# Patient Record
Sex: FEMALE | Race: BLACK OR AFRICAN AMERICAN
[De-identification: names, ages, dates, MRNs, and addresses within clinical notes are randomized per-mention and may not be internally consistent; named-entity substitution may affect disease eponyms.]

---

## 2017-07-31 ENCOUNTER — HOSPITAL ENCOUNTER (EMERGENCY)
Dept: HOSPITAL 74 - JERFT | Age: 57
Discharge: HOME | End: 2017-07-31
Payer: COMMERCIAL

## 2017-07-31 VITALS — BODY MASS INDEX: 26.5 KG/M2

## 2017-07-31 VITALS — TEMPERATURE: 97.9 F | DIASTOLIC BLOOD PRESSURE: 79 MMHG | SYSTOLIC BLOOD PRESSURE: 174 MMHG | HEART RATE: 53 BPM
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
## 2017-07-31 DIAGNOSIS — S82.832A: Primary | ICD-10-CM

## 2017-07-31 PROCEDURE — 2W3MX1Z IMMOBILIZATION OF LEFT LOWER EXTREMITY USING SPLINT: ICD-10-PCS

## 2017-07-31 NOTE — PDOC
History of Present Illness





- General


Chief Complaint: Injury


Stated Complaint: ANKLE PAIN


Time Seen by Provider: 07/31/17 11:45


History Source: Patient


Exam Limitations: No Limitations





- History of Present Illness


Initial Comments: 





07/31/17 12:33


Was walking this morning, stumbled and fell, uncertain as to mechanism 

sustained a left ankle injury. States is painful to walk unable to bear weight. 

No previous injury to same. No other injury


Occurred: reports: just prior to arrival


Severity: reports: mild


Modifying Factors: improves with: None


Associated Symptoms (Fall): denies symptoms





Past History





- Travel


Traveled outside of the country in the last 30 days: No


Close contact w/someone who was outside of country & ill: No





- Past Medical History


Allergies/Adverse Reactions: 


 Allergies











Allergy/AdvReac Type Severity Reaction Status Date / Time


 


prednisone Allergy   Verified 07/31/17 11:07











Home Medications: 


Ambulatory Orders





Alendronate Na [Fosamax (Weekly)] 70 mg PO WEEKLY 02/23/15 


Famotidine [Pepcid -] 20 mg PO DAILY 02/23/15 


Hydrochlorothiazide [Hctz -] 12.5 mg PO DAILY 02/23/15 


Levothyroxine [Synthroid -] 50 mcg PO DAILY 02/23/15 


Simvastatin [Zocor -] 20 mg PO HS 02/23/15 


Metoprolol Tartrate [Lopressor -] 25 mg PO BID 02/24/15 


Acetaminophen/Caffeine/Butalb [Fioricet -] 1 tab PO Q6H PRN #20 tablet 08/31/16 


Acetaminophen/Caffeine/Butalb [Fioricet -] 1 tab PO Q6H PRN #20 tablet 08/31/16 


Metoclopramide HCl [Reglan] 10 mg PO Q6H PRN #15 tablet 08/31/16 


Naproxen [Naprosyn -] 500 mg PO BID PRN #20 tablet 08/31/16 








Anemia: No


Asthma: No


Cancer: No


COPD: No


Diabetes: No


HTN: Yes


Hypercholesterolemia: Yes


Liver Disease: No


Seizures: No


Thyroid Disease: Yes (Hypo)





- Surgical History


Abdominal Surgery: No


Lung Surgery: No


Neurologic Surgery: No





- Immunization History


Immunization Up to Date: Yes





- Psycho/Social/Smoking Cessation Hx


Suicidal Ideation: No


Smoking History: Never smoked


Have you smoked in the past 12 months: No


If you are a former smoker, when did you quit?: 20yrs


Information on smoking cessation initiated: No


Hx Alcohol Use: No


Drug/Substance Use Hx: No


Substance Use Type: None





Trauma Specific PMHX





- Complaint Specific PMHX


Back Injury: No


Neck Injury: No





**Review of Systems





- Review of Systems


Able to Perform ROS?: Yes


Is the patient limited English proficient: Yes


Constitutional: Yes: Symptoms Reported


Respiratory: No: Symptoms reported


Musculoskeletal: Yes: Symptoms Reported, See HPI, Joint Pain, Joint Swelling (

swelling and pain to )


Integumentary: Yes: Symptoms Reported, See HPI, Bruising


Neurological: Yes: See HPI.  No: Symptoms reported


All Other Systems: Reviewed and Negative





*Physical Exam





- Vital Signs


 Last Vital Signs











Temp Pulse Resp BP Pulse Ox


 


 97.9 F   53 L  17   174/79   100 


 


 07/31/17 11:07  07/31/17 11:07  07/31/17 11:07  07/31/17 11:07  07/31/17 11:07














- Physical Exam


General Appearance: Yes: Nourished, Appropriately Dressed, Apparent Distress, 

Mild Distress


HEENT: positive: [place], Normal ENT Inspection, TMs Normal, Pharynx Normal


Extremity: positive: Normal Capillary Refill, Tender (point tenderness to the 

lateral malleolus, with swelling, negative squeeze test, negative navicular or 

fifth metatarsal pain. Range of motion is limited secondary to this tenderness. 

Neurovascular intact to foot).  negative: Normal Inspection, Normal Range of 

Motion


Integumentary: positive: Dry, Warm, Ecchymosis


Neurologic: positive: CNs II-XII NML intact, Fully Oriented, Alert, Normal Mood/

Affect, Normal Response, Motor Strength 5/5





ED Treatment Course





- RADIOLOGY


Radiology Studies Ordered: 














 Category Date Time Status


 


 ANKLE-LEFT [RAD] Stat Radiology  07/31/17 11:46 Taken














Progress Note





- Progress Note


Progress Note: 


Distal fibula fracture/avulsion fracture. Ace wrap Aircast and crutches 

provided. Will follow-up with Dr. Rodriguez orthopedist this week





*DC/Admit/Observation/Transfer


Diagnosis at time of Disposition: 


Fracture of distal end of left fibula


Qualifiers:


 Encounter type: initial encounter Fracture type: closed Fracture morphology: 

other fracture Qualified Code(s): S82.832A - Other fracture of upper and lower 

end of left fibula, initial encounter for closed fracture





- Discharge Dispostion


Disposition: HOME


Condition at time of disposition: Stable


Admit: No





- Referrals


Referrals: 


Peter Deleon MD [Primary Care Provider] - 


Ed Rodriguez MD [Staff Physician] - 





- Patient Instructions


Printed Discharge Instructions:  DI for Ankle Fracture


Additional Instructions: 


Rest, ice to area on and off for 15 minutes 4-6 times a day


Avoid heavy lifting or exercise until pain and swelling is resolved or until 

further directed


Keep area highly elevated to reduce swelling


Use splints/Ace wrap as directed


Followup with orthopedist in one to 2 days if not improving, 


    if significantly improved may wait one week for followup with orthopedist





May use ibuprofen 2-200 mg tablets every 6 hours as needed for pain


May take one half to one tablet of the Percocet tablets every 8 hours, 

remembering will make very dizzy and sleepy





Dr. Rodriguez sees patients on Tuesday mornings 9 AM to 12 PM and Thursday 

afternoon 12 PM to 4 PM on [address] at [facility]





- Post Discharge Activity


Work/School Note:  Back to Work

## 2018-01-31 ENCOUNTER — HOSPITAL ENCOUNTER (OUTPATIENT)
Dept: HOSPITAL 74 - FASU-ENDO | Age: 58
Discharge: HOME | End: 2018-01-31
Attending: INTERNAL MEDICINE
Payer: COMMERCIAL

## 2018-01-31 VITALS — SYSTOLIC BLOOD PRESSURE: 106 MMHG | DIASTOLIC BLOOD PRESSURE: 74 MMHG | TEMPERATURE: 98 F | HEART RATE: 68 BPM

## 2018-01-31 VITALS — BODY MASS INDEX: 30.9 KG/M2

## 2018-01-31 DIAGNOSIS — K25.9: ICD-10-CM

## 2018-01-31 DIAGNOSIS — D13.1: ICD-10-CM

## 2018-01-31 DIAGNOSIS — D64.9: Primary | ICD-10-CM

## 2018-01-31 DIAGNOSIS — K29.50: ICD-10-CM

## 2018-01-31 PROCEDURE — 0DB68ZX EXCISION OF STOMACH, VIA NATURAL OR ARTIFICIAL OPENING ENDOSCOPIC, DIAGNOSTIC: ICD-10-PCS | Performed by: INTERNAL MEDICINE

## 2018-01-31 PROCEDURE — 0DB98ZX EXCISION OF DUODENUM, VIA NATURAL OR ARTIFICIAL OPENING ENDOSCOPIC, DIAGNOSTIC: ICD-10-PCS | Performed by: INTERNAL MEDICINE

## 2018-01-31 PROCEDURE — 0DB48ZX EXCISION OF ESOPHAGOGASTRIC JUNCTION, VIA NATURAL OR ARTIFICIAL OPENING ENDOSCOPIC, DIAGNOSTIC: ICD-10-PCS | Performed by: INTERNAL MEDICINE

## 2018-02-02 NOTE — PATH
Surgical Pathology Report



Patient Name:  ALLYSSA HENRIQUEZ

Accession #:  

[facility]. Rec. #:  [id number]                                                        

   /Age/Gender:  1960 (Age: 57) / F

Account:  [id number]                                                          

             Location: [facility]-ENDOSCOPY

Taken:  2018

Received:  2018

Reported:  2018

Physicians:  Eileen Mckeon M.D.

  



Specimen(s) Received

A: BX DUODENUM 

B: BX ANTRUM 

C: BX GE JUNCTION 





Clinical History

Preoperative diagnosis: Anemia

Postoperative diagnosis: Rule out celiac disease, mild gastritis, antral ulcer

and erosions







Final Diagnosis

A. DUODENUM, BIOPSY:

DUODENAL MUCOSA WITH NO PATHOLOGIC CHANGES.

NO HISTOLOGIC EVIDENCE OF GLUTEN SENSITIVE ENTEROPATHY (CELIAC SPRUE)

IDENTIFIED. 



B. STOMACH, ANTRUM, BIOPSY:

FOCAL MILD CHRONIC GASTRITIS.

IMMUNOSTAIN FOR H. PYLORI IS NEGATIVE.  



C. GE JUNCTION, BIOPSY:

SQUAMOUS AND GASTRIC MUCOSA WITH CHRONIC INFLAMMATION, WITH PAPILLOMATOSIS

SUGGESTIVE OF REFLUX ESOPHAGITIS, AND FOCAL INTESTINAL METAPLASIA CONSISTENT

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

NO DYSPLASIA IDENTIFIED.







***Electronically Signed***

Steve Sanford M.D.





Gross Description

A.  Received in formalin, labeled "duodenum" is a tan, irregular portion of soft

tissue measuring 0.4 cm. in greatest dimension. The specimen is submitted in

toto in one cassette.



B.  Received in formalin, labeled "antrum" are 2 tan, irregular portions of soft

tissue averaging 0.4 cm. in greatest dimension. The specimens are submitted in

toto in one cassette.



C.  Received in formalin, labeled "GE junction" are 2 tan, irregular portions of

soft tissue averaging 0.2 cm. in greatest dimension. The specimens are submitted

in toto in one cassette.





[facility]

## 2018-10-10 ENCOUNTER — HOSPITAL ENCOUNTER (OUTPATIENT)
Dept: HOSPITAL 74 - FASU-ENDO | Age: 58
Discharge: HOME | End: 2018-10-10
Attending: INTERNAL MEDICINE
Payer: COMMERCIAL

## 2018-10-10 VITALS — BODY MASS INDEX: 32 KG/M2

## 2018-10-10 VITALS — DIASTOLIC BLOOD PRESSURE: 65 MMHG | SYSTOLIC BLOOD PRESSURE: 112 MMHG | HEART RATE: 72 BPM

## 2018-10-10 VITALS — TEMPERATURE: 98.3 F

## 2018-10-10 DIAGNOSIS — D64.9: Primary | ICD-10-CM

## 2018-10-10 DIAGNOSIS — K64.2: ICD-10-CM

## 2018-10-10 PROCEDURE — 0DJD8ZZ INSPECTION OF LOWER INTESTINAL TRACT, VIA NATURAL OR ARTIFICIAL OPENING ENDOSCOPIC: ICD-10-PCS | Performed by: INTERNAL MEDICINE

## 2019-12-28 ENCOUNTER — HOSPITAL ENCOUNTER (EMERGENCY)
Dept: HOSPITAL 74 - JER | Age: 59
Discharge: HOME | End: 2019-12-28
Payer: COMMERCIAL

## 2019-12-28 VITALS — SYSTOLIC BLOOD PRESSURE: 166 MMHG | DIASTOLIC BLOOD PRESSURE: 92 MMHG | HEART RATE: 80 BPM | TEMPERATURE: 97.9 F

## 2019-12-28 VITALS — BODY MASS INDEX: 31.8 KG/M2

## 2019-12-28 DIAGNOSIS — M25.512: ICD-10-CM

## 2019-12-28 DIAGNOSIS — M79.18: ICD-10-CM

## 2019-12-28 DIAGNOSIS — E78.5: ICD-10-CM

## 2019-12-28 DIAGNOSIS — M79.602: Primary | ICD-10-CM

## 2019-12-28 DIAGNOSIS — Z88.8: ICD-10-CM

## 2019-12-28 DIAGNOSIS — I10: ICD-10-CM

## 2019-12-28 DIAGNOSIS — E78.00: ICD-10-CM

## 2019-12-28 PROCEDURE — 3E0233Z INTRODUCTION OF ANTI-INFLAMMATORY INTO MUSCLE, PERCUTANEOUS APPROACH: ICD-10-PCS

## 2019-12-28 NOTE — PDOC
History of Present Illness





- General


Chief Complaint: Pain


Stated Complaint: LT ARM PAIN


Time Seen by Provider: 12/28/19 09:40


History Source: Patient


Exam Limitations: No Limitations





- History of Present Illness


Associated Symptoms: denies: chest pain, fever/chills, rash, shortness of breath

, weakness





Past History





- Travel


Traveled outside of the country in the last 30 days: No


Close contact w/someone who was outside of country & ill: No





- Past Medical History


Allergies/Adverse Reactions: 


 Allergies











Allergy/AdvReac Type Severity Reaction Status Date / Time


 


prednisone Allergy   Verified 12/28/19 08:37











Home Medications: 


Ambulatory Orders





Alendronate Na [Fosamax (Weekly)] 70 mg PO WEEKLY 02/23/15 


Hydrochlorothiazide [Hctz -] 12.5 mg PO DAILY 02/23/15 


Levothyroxine [Synthroid -] 50 mcg PO DAILY 02/23/15 


Simvastatin [Zocor -] 20 mg PO HS 02/23/15 


Pantoprazole Sodium 40 mg PO DAILY 07/25/19 


Aspirin 81 mg PO DAILY #30 tab.chew 07/26/19 


Metoprolol Succinate 25 mg PO BID 07/26/19 


Ibuprofen 800 mg PO ACDIN 7 Days #21 tablet 12/28/19 


Methocarbamol [Robaxin -] 500 mg PO BID #14 tablet 12/28/19 








Anemia: No


Asthma: No


Cancer: No


Cardiac Disorders: No


CVA: No


COPD: No


CHF: No


Dementia: No


Diabetes: No


GI Disorders: Yes


 Disorders: No


HTN: Yes


Hypercholesterolemia: Yes


Liver Disease: No


Seizures: No


Thyroid Disease: Yes





- Surgical History


Abdominal Surgery: No


Appendectomy: No


Cardiac Surgery: No


Cholecystectomy: No


Lung Surgery: No


Neurologic Surgery: No


Orthopedic Surgery: No





- Immunization History


Immunization Up to Date: Yes





- Psycho Social/Smoking Cessation Hx


Smoking History: Never smoked


Have you smoked in the past 12 months: No


If you are a former smoker, when did you quit?: 20yrs


Hx Alcohol Use: No


Drug/Substance Use Hx: No


Substance Use Type: None


Hx Substance Use Treatment: No





**Review of Systems





- Review of Systems


Able to Perform ROS?: Yes


Is the patient limited English proficient: No


Constitutional: No: Chills, Fever


Cardiac (ROS): No: Chest Pain, Lightheadedness, Palpitations, Chest Tightness


Musculoskeletal: Yes: Muscle Pain.  No: Back Pain, Gout, Joint Pain, Joint 

Swelling, Muscle Weakness, Joint Stiffness


Neurological: No: Dizziness





*Physical Exam





- Vital Signs


 Last Vital Signs











Temp Pulse Resp BP Pulse Ox


 


 97.9 F   80   16   166/92   99 


 


 12/28/19 08:40  12/28/19 08:40  12/28/19 08:40  12/28/19 08:40  12/28/19 08:40














- Physical Exam


General Appearance: Yes: Nourished


HEENT: positive: EOMI


Neck: positive: Supple


Respiratory/Chest: positive: Lungs Clear, Normal Breath Sounds


Cardiovascular: positive: Regular Rhythm, Regular Rate, S1, S2


Musculoskeletal: positive: Muscle Spasm (left upper arm pain on palpation, 

limited ROM due to pain, distal pulse intct,  strenght 4/5)


Extremity: positive: Normal Capillary Refill, Normal Inspection


Neurologic: positive: CNs II-XII NML intact, Fully Oriented, Alert, Normal Mood/

Affect, Normal Response, Motor Strength 5/5





ED Treatment Course





- RADIOLOGY


Radiology Studies Ordered: 














 Category Date Time Status


 


 HUMERUS-LEFT [RAD] Stat Radiology  12/28/19 09:42 Ordered


 


 SHOULDER-LEFT [RAD] Stat Radiology  12/28/19 09:42 Ordered














- Medications


Given in the ED: 


ED Medications














Discontinued Medications














Generic Name Dose Route Start Last Admin





  Trade Name Freq  PRN Reason Stop Dose Admin


 


Ketorolac Tromethamine  60 mg  12/28/19 09:42  12/28/19 09:53





  Toradol Injection -  IM  12/28/19 09:43  60 mg





  ONCE ONE   Administration





     





     





     





     














Medical Decision Making





- Medical Decision Making





12/28/19 09:58


59 years old female with no prior medical history she is right-hand dominant 

presents with atraumatic left shoulder and upper arm pain for 1 week.  Patient 

denies chest pain, shortness of breath, weakness,.  She reports heavy lifting 

at work.  She is taking Tylenol at home with no relief.


X-rays were normal patient felt better after Toradol.  Range of motion 

reassessed with improvement


12/28/19 12:19








Discharge





- Discharge Information


Problems reviewed: Yes


Clinical Impression/Diagnosis: 


 Arm pain, left





Shoulder pain, left


Qualifiers:


 Chronicity: acute Qualified Code(s): M25.512 - Pain in left shoulder





Condition: Stable


Disposition: HOME





- Admission


No





- Additional Discharge Information


Prescriptions: 


Ibuprofen 800 mg PO ACDIN 7 Days #21 tablet


Methocarbamol [Robaxin -] 500 mg PO BID #14 tablet


Prescription Drug Monitoring Program (I-STOP) results: I-STOP not reviewed





- Follow up/Referral


Referrals: 


Peter Deleon MD [Primary Care Provider] - 


Christiano Bhatia MD [Staff Physician] - 





- Patient Discharge Instructions


Patient Printed Discharge Instructions:  DI for Arm Pain


Additional Instructions: 


Your x-rays were negative for acute fracture or dislocation   There is some 

calcification in the shoulder that could cause irritation.  Please take 

medication as prescribed.  Follow-up with orthopedic if pain persists.  Return 

to the emergency room if worsening symptoms occurs





- Post Discharge Activity